# Patient Record
Sex: FEMALE | Race: WHITE | Employment: FULL TIME | URBAN - METROPOLITAN AREA
[De-identification: names, ages, dates, MRNs, and addresses within clinical notes are randomized per-mention and may not be internally consistent; named-entity substitution may affect disease eponyms.]

---

## 2019-01-15 ENCOUNTER — OFFICE VISIT (OUTPATIENT)
Dept: URGENT CARE | Facility: CLINIC | Age: 19
End: 2019-01-15
Payer: COMMERCIAL

## 2019-01-15 VITALS
OXYGEN SATURATION: 98 % | WEIGHT: 179 LBS | BODY MASS INDEX: 30.56 KG/M2 | DIASTOLIC BLOOD PRESSURE: 65 MMHG | TEMPERATURE: 97.4 F | RESPIRATION RATE: 16 BRPM | HEART RATE: 94 BPM | SYSTOLIC BLOOD PRESSURE: 122 MMHG | HEIGHT: 64 IN

## 2019-01-15 DIAGNOSIS — R11.10 ABDOMINAL PAIN WITH VOMITING: Primary | ICD-10-CM

## 2019-01-15 DIAGNOSIS — R10.9 ABDOMINAL PAIN WITH VOMITING: Primary | ICD-10-CM

## 2019-01-15 PROCEDURE — 99213 OFFICE O/P EST LOW 20 MIN: CPT | Performed by: PHYSICIAN ASSISTANT

## 2019-01-15 RX ORDER — ONDANSETRON 4 MG/1
4 TABLET, ORALLY DISINTEGRATING ORAL EVERY 6 HOURS PRN
Qty: 20 TABLET | Refills: 0 | Status: SHIPPED | OUTPATIENT
Start: 2019-01-15

## 2019-01-15 NOTE — LETTER
January 15, 2019     Patient: Felecia Franco   YOB: 2000   Date of Visit: 1/15/2019       To Whom it May Concern:    Felecia Franco was seen in my clinic on 1/15/2019  She may return to work on 01/17/2019  Patient may return sooner if symptoms have improved  If you have any questions or concerns, please don't hesitate to call  Sincerely,          Marissa Pepe PA-C        CC: No Recipients

## 2019-01-15 NOTE — PATIENT INSTRUCTIONS
Use medication as directed for symptoms  Motrin and/or Tylenol as needed for fevers or aches and pains  Drink plenty of fluids and stay well hydrated  Follow up with PCP in 3-5 days  Proceed to  ER if symptoms worsen  Abdominal Pain   AMBULATORY CARE:   Abdominal pain  can be dull, achy, or sharp  You may have pain in one area of your abdomen, or in your entire abdomen  Your pain may be caused by a condition such as constipation, food sensitivity or poisoning, infection, or a blockage  Abdominal pain can also be from a hernia, appendicitis, or an ulcer  Liver, gallbladder, or kidney conditions can also cause abdominal pain  The cause of your abdominal pain may be unknown  Seek care immediately if:   · You have new chest pain or shortness of breath  · You have pulsing pain in your upper abdomen or lower back that suddenly becomes constant  · Your pain is in the right lower abdominal area and worsens with movement  · You have a fever over 100 4°F (38°C) or shaking chills  · You are vomiting and cannot keep food or liquids down  · Your pain does not improve or gets worse over the next 8 to 12 hours  · You see blood in your vomit or bowel movements, or they look black and tarry  · Your skin or the whites of your eyes turn yellow  · You are a woman and have a large amount of vaginal bleeding that is not your monthly period  Contact your healthcare provider if:   · You have pain in your lower back  · You are a man and have pain in your testicles  · You have pain when you urinate  · You have questions or concerns about your condition or care  Treatment for abdominal pain  may include medicine to calm your stomach, prevent vomiting, or decrease pain  Follow up with your healthcare provider as directed:  Write down your questions so you remember to ask them during your visits     © 2017 [employer] Information is for End User's use only and may not be sold, redistributed or otherwise used for commercial purposes  All illustrations and images included in CareNotes® are the copyrighted property of A [employer] M [employer]  or [employer]  The above information is an  only  It is not intended as medical advice for individual conditions or treatments  Talk to your doctor, nurse or pharmacist before following any medical regimen to see if it is safe and effective for you  Acute Nausea and Vomiting   AMBULATORY CARE:   Acute nausea and vomiting  starts suddenly, gets worse quickly, and lasts a short time  Common causes include pregnancy, alcohol, infection, and medicines  A head injury, heart attack, or inner ear imbalance can also cause acute nausea and vomiting  Seek care immediately if:   · You see blood in your vomit or your bowel movements  · You have sudden, severe pain in your chest and upper abdomen after hard vomiting or retching  · You have swelling in your neck and chest      · You are dizzy, cold, and thirsty and your eyes and mouth are dry  · You are urinating very little or not at all  · You have muscle weakness, leg cramps, and trouble breathing  · Your heart is beating much faster than normal      · You continue to vomit for more than 48 hours  Contact your healthcare provider if:   · You have frequent dry heaves (vomiting but nothing comes out)  · Your nausea and vomiting does not get better or go away after you use medicine  · You have questions or concerns about your condition or treatment  Treatment for acute nausea and vomiting  may include medicines to calm your stomach and stop the vomiting  You may need IV fluids if you are dehydrated  Prevent or manage acute nausea and vomiting:   · Do not drink alcohol  Alcohol may upset or irritate your stomach  Too much alcohol can also cause acute nausea and vomiting  · Control stress  Headaches due to stress may cause nausea and vomiting   Find ways to relax and manage your stress  Get more rest and sleep  · Drink more liquids as directed  Vomiting can lead to dehydration  It is important to drink more liquids to help replace lost body fluids  Ask your healthcare provider how much liquid to drink each day and which liquids are best for you  Your provider may recommend that you drink an oral rehydration solution (ORS)  ORS contains water, salts, and sugar that are needed to replace the lost body fluids  Ask what kind of ORS to use, how much to drink, and where to get it  · Eat smaller meals, more often  Eat small amounts of food every 2 to 3 hours, even if you are not hungry  Food in your stomach may decrease your nausea  · Talk to your healthcare provider before you take over-the-counter (OTC) medicines  These medicines can cause serious problems if you use certain other medicines, or you have a medical condition  You may have problems if you use too much or use them for longer than the label says  Follow directions on the label carefully  Follow up with your healthcare provider as directed:  Write down your questions so you remember to ask them during your visits  © 2017 [facility] [facility] Information is for End User's use only and may not be sold, redistributed or otherwise used for commercial purposes  All illustrations and images included in CareNotes® are the copyrighted property of A [employer] A M [employer]  or [employer]  The above information is an  only  It is not intended as medical advice for individual conditions or treatments  Talk to your doctor, nurse or pharmacist before following any medical regimen to see if it is safe and effective for you  Gastroenteritis   AMBULATORY CARE:   Gastroenteritis , or stomach flu, is an infection of the stomach and intestines  It is caused by bacteria, parasites, or viruses         Common symptoms include the following:   · Diarrhea or gas    · Nausea, vomiting, or poor appetite    · Abdominal cramps, pain, or gurgling    · Fever    · Tiredness or weakness    · Headaches or muscle aches with any of the above symptoms  Call 911 for any of the following:   · You have trouble breathing or a very fast pulse  Seek care immediately if:   · You see blood in your diarrhea  · You cannot stop vomiting  · You have not urinated for 12 hours  · You feel like you are going to faint  Contact your healthcare provider if:   · You have a fever  · You continue to vomit or have diarrhea, even after treatment  · You see worms in your diarrhea  · Your mouth or eyes are dry  You are not urinating as much or as often  · You have questions or concerns about your condition or care  Treatment for gastroenteritis  may include medicines to slow or stop your diarrhea or vomiting  You may also need medicines to treat an infection caused by bacteria or a parasite  Manage your symptoms:   · Drink liquids as directed  Ask your healthcare provider how much liquid to drink each day, and which liquids are best for you  You may also need to drink an oral rehydration solution (ORS)  An ORS has the right amounts of sugar, salt, and minerals in water to replace body fluids  · Eat bland foods  When you feel hungry, begin eating soft, bland foods  Examples are bananas, clear soup, potatoes, and applesauce  Do not have dairy products, alcohol, sugary drinks, or drinks with caffeine until you feel better  · Rest as much as possible  Slowly start to do more each day when you begin to feel better  Prevent the spread of germs:  Gastroenteritis can spread easily  Keep yourself, your family, and your surroundings clean to help prevent the spread of gastroenteritis:  · Wash your hands often  Use soap and water  Wash your hands after you use the bathroom, change a child's diapers, or sneeze  Wash your hands before you prepare or eat food  · Clean surfaces and do laundry often    Wash your clothes and towels separately from the rest of the laundry  Clean surfaces in your home with antibacterial  or bleach  · Clean food thoroughly and cook safely  Wash raw vegetables before you cook  Cook meat, fish, and eggs fully  Do not use the same dishes for raw meat as you do for other foods  Refrigerate any leftover food immediately  · Be aware when you camp or travel  Drink only clean water  Do not drink from rivers or lakes unless you purify or boil the water first  When you travel, drink bottled water and do not add ice  Do not eat fruit that has not been peeled  Do not eat raw fish or meat that is not fully cooked  Follow up with your healthcare provider as directed:  Write down your questions so you remember to ask them during your visits  © 2017 [employer] Information is for End User's use only and may not be sold, redistributed or otherwise used for commercial purposes  All illustrations and images included in CareNotes® are the copyrighted property of [employer]  or [employer]  The above information is an  only  It is not intended as medical advice for individual conditions or treatments  Talk to your doctor, nurse or pharmacist before following any medical regimen to see if it is safe and effective for you

## 2019-01-15 NOTE — PROGRESS NOTES
[facility]        NAME: Nimo Layne is a 19 y o  female  : 2000    MRN: [id number]  DATE: January 15, 2019  TIME: 4:42 PM    Assessment and Plan   Abdominal pain with vomiting [R10 9, R11 10]  1  Abdominal pain with vomiting  ondansetron (ZOFRAN-ODT) 4 mg disintegrating tablet         Patient Instructions     Use medication as directed for symptoms  Motrin and/or Tylenol as needed for fevers or aches and pains  Drink plenty of fluids and stay well hydrated  Follow up with PCP in 3-5 days  Proceed to  ER if symptoms worsen  Chief Complaint     Chief Complaint   Patient presents with    Nausea     Nausea starting this morning         History of Present Illness       75-year-old female presents with abdominal pain nausea and vomiting  Reports symptoms started this morning when she woke up  Symptoms were worse this morning with multiple bouts of vomiting and stop and more stomach pain  Symptoms have seemed to be improving over the course the day but still feels very nauseous  Denies any diarrhea  No fevers chills  No headaches sore throat or ear pains reported  Has been tolerating small sips of water      Vomiting    This is a new problem  The current episode started today  The problem has been waxing and waning  The emesis has an appearance of stomach contents  There has been no fever  The fever has been present for less than 1 day  Associated symptoms include abdominal pain  Pertinent negatives include no arthralgias, chest pain, chills, coughing, diarrhea, dizziness, fever or myalgias  Risk factors include ill contacts  She has tried nothing for the symptoms  The treatment provided no relief  Review of Systems   Review of Systems   Constitutional: Negative  Negative for chills and fever  HENT: Negative  Eyes: Negative  Respiratory: Negative  Negative for cough  Cardiovascular: Negative  Negative for chest pain     Gastrointestinal: Positive for abdominal pain and vomiting  Negative for diarrhea  Musculoskeletal: Negative  Negative for arthralgias and myalgias  Skin: Negative  Neurological: Negative  Negative for dizziness  Current Medications       Current Outpatient Prescriptions:     UNKNOWN TO PATIENT, , Disp: , Rfl:     ondansetron (ZOFRAN-ODT) 4 mg disintegrating tablet, Take 1 tablet (4 mg total) by mouth every 6 (six) hours as needed for nausea or vomiting, Disp: 20 tablet, Rfl: 0    Current Allergies     Allergies as of 01/15/2019    (No Known Allergies)            The following portions of the patient's history were reviewed and updated as appropriate: allergies, current medications, past family history, past medical history, past social history, past surgical history and problem list      Past Medical History:   Diagnosis Date    Known health problems: none        Past Surgical History:   Procedure Laterality Date    WISDOM TOOTH EXTRACTION         History reviewed  No pertinent family history  Medications have been verified  Objective   /65   Pulse 94   Temp (!) 97 4 °F (36 3 °C) (Tympanic)   Resp 16   Ht 5' 4" (1 626 m)   Wt 81 2 kg (179 lb)   LMP 12/29/2018 (Approximate)   SpO2 98%   BMI 30 73 kg/m²        Physical Exam     Physical Exam   Constitutional: She is oriented to person, place, and time  She appears well-developed and well-nourished  No distress  HENT:   Head: Normocephalic and atraumatic  Right Ear: External ear normal    Left Ear: External ear normal    Nose: Nose normal    Mouth/Throat: Oropharynx is clear and moist  No oropharyngeal exudate  Eyes: Conjunctivae are normal  Right eye exhibits no discharge  Left eye exhibits no discharge  Neck: Normal range of motion  Neck supple  Cardiovascular: Normal rate, regular rhythm, normal heart sounds and intact distal pulses  No murmur heard  Pulmonary/Chest: Effort normal and breath sounds normal  No respiratory distress  She has no wheezes  She has no rales  Abdominal: Soft  Bowel sounds are normal  There is tenderness (Mild to moderate) in the epigastric area  Musculoskeletal: Normal range of motion  Lymphadenopathy:     She has no cervical adenopathy  Neurological: She is alert and oriented to person, place, and time  Skin: Skin is warm and dry  Psychiatric: She has a normal mood and affect  Nursing note and vitals reviewed

## 2021-09-29 ENCOUNTER — OFFICE VISIT (OUTPATIENT)
Dept: URGENT CARE | Facility: CLINIC | Age: 21
End: 2021-09-29
Payer: COMMERCIAL

## 2021-09-29 VITALS
OXYGEN SATURATION: 96 % | RESPIRATION RATE: 16 BRPM | HEIGHT: 64 IN | WEIGHT: 185 LBS | BODY MASS INDEX: 31.58 KG/M2 | TEMPERATURE: 97.6 F | HEART RATE: 86 BPM

## 2021-09-29 DIAGNOSIS — B34.9 VIRAL SYNDROME: Primary | ICD-10-CM

## 2021-09-29 PROCEDURE — 99213 OFFICE O/P EST LOW 20 MIN: CPT | Performed by: PHYSICIAN ASSISTANT

## 2021-09-29 PROCEDURE — U0005 INFEC AGEN DETEC AMPLI PROBE: HCPCS | Performed by: PHYSICIAN ASSISTANT

## 2021-09-29 PROCEDURE — U0003 INFECTIOUS AGENT DETECTION BY NUCLEIC ACID (DNA OR RNA); SEVERE ACUTE RESPIRATORY SYNDROME CORONAVIRUS 2 (SARS-COV-2) (CORONAVIRUS DISEASE [COVID-19]), AMPLIFIED PROBE TECHNIQUE, MAKING USE OF HIGH THROUGHPUT TECHNOLOGIES AS DESCRIBED BY CMS-2020-01-R: HCPCS | Performed by: PHYSICIAN ASSISTANT

## 2021-09-29 NOTE — LETTER
September 29, 2021     Patient: Lupe Burton   YOB: 2000   Date of Visit: 9/29/2021       To Whom It May Concern: It is my medical opinion that Lupe Burton should remain out of work for 10 days since symptom onset or 24 hours fever free without the use of fever reducing drugs, whichever is longer AND overall general improvement in symptoms OR 10 days since last exposure OR negative results           Sincerely,        Jay Pedersen PA-C

## 2021-09-29 NOTE — PROGRESS NOTES
[facility]        NAME: Ignacio Lozano is a 19 y o  female  : 2000    MRN: [id number]  DATE: 2021  TIME: 9:37 AM    Assessment and Plan   Viral syndrome [B34 9]  1  Viral syndrome  Novel Coronavirus (Covid-19),PCR [facility] - Office Collection         Patient Instructions   Covid 19 results will return in a 24-48 hours  If you view your results on MyChart, we will not call you  If you do not see results on MyChart, we will call you if your positive or negative  Prophylactically self quarantine  Department of health's newest recommendations state patient should self quarantine for 10 days since symptom onset or 24 hours fever free without the use of fever reducing drugs (Tylenol and ibuprofen), whichever is longer AND overall improvement of symptoms  Drink lots of fluids to maintain hydration  Do not touch your face, wash hands often, and practice social distancing  There is no treatment for outpatient COVID-19 however, CDC recommends 1000 mg vitamin-C, 2000 units vitamin D3, and 100 mg zinc to boost the immune system  Call your family doctor to have a follow-up appointment in next few days  Go to ER if he began experiencing chest pain, shortness of breath, fever that is not responding to antipyretics or other severe symptoms  Follow up with PCP in 3-5 days  Proceed to  ER if symptoms worsen  Chief Complaint     Chief Complaint   Patient presents with    COVID-19     nausea, congestion, sore throat resolved, chills, dizziness, fatigue  symptoms started yesterday  History of Present Illness         Patient is a 68-year-old female with no significant past medical history presents to office complaining of fatigue, chills, dizziness, congestion, sore throat, and nausea since yesterday  She denies fever, cough, SOB, CP, difficulty breathing, anosmia, dysgeusia, or weakness     Patient's boyfriend is currently sick you had a direct exposure to go ahead and is waiting on his test results  Denies prior COVID-19 infection  Denies COVID-19 vaccination  Review of Systems   Review of Systems   Constitutional: Positive for chills and fatigue  Negative for fever  HENT: Positive for congestion and sore throat  Respiratory: Negative for cough and shortness of breath  Cardiovascular: Negative for chest pain and palpitations  Gastrointestinal: Positive for nausea  Negative for abdominal pain, diarrhea and vomiting  Musculoskeletal: Negative for myalgias  Neurological: Positive for dizziness  Negative for light-headedness and headaches  Current Medications       Current Outpatient Medications:     ondansetron (ZOFRAN-ODT) 4 mg disintegrating tablet, Take 1 tablet (4 mg total) by mouth every 6 (six) hours as needed for nausea or vomiting (Patient not taking: Reported on 9/29/2021), Disp: 20 tablet, Rfl: 0    UNKNOWN TO PATIENT, , Disp: , Rfl:     Current Allergies     Allergies as of 09/29/2021    (No Known Allergies)            The following portions of the patient's history were reviewed and updated as appropriate: allergies, current medications, past family history, past medical history, past social history, past surgical history and problem list      Past Medical History:   Diagnosis Date    Known health problems: none        Past Surgical History:   Procedure Laterality Date    WISDOM TOOTH EXTRACTION         History reviewed  No pertinent family history  Medications have been verified  Objective   Pulse 86   Temp 97 6 °F (36 4 °C)   Resp 16   Ht 5' 4" (1 626 m)   Wt 83 9 kg (185 lb)   LMP 09/15/2021   SpO2 96%   BMI 31 76 kg/m²   Patient's last menstrual period was 09/15/2021  Physical Exam     Physical Exam  Vitals and nursing note reviewed  Constitutional:       Appearance: Normal appearance  She is well-developed  HENT:      Head: Normocephalic and atraumatic        Right Ear: Tympanic membrane, ear canal and external ear normal  Left Ear: Tympanic membrane, ear canal and external ear normal       Nose: Congestion and rhinorrhea present  Mouth/Throat:      Pharynx: Uvula midline  Eyes:      General: Lids are normal       Conjunctiva/sclera: Conjunctivae normal       Pupils: Pupils are equal, round, and reactive to light  Cardiovascular:      Rate and Rhythm: Normal rate and regular rhythm  Pulses: Normal pulses  Heart sounds: Normal heart sounds  No murmur heard  No friction rub  No gallop  Pulmonary:      Effort: Pulmonary effort is normal       Breath sounds: Normal breath sounds  No wheezing, rhonchi or rales  Abdominal:      General: Bowel sounds are normal       Palpations: Abdomen is soft  Tenderness: There is no abdominal tenderness  Musculoskeletal:         General: Normal range of motion  Cervical back: Neck supple  Lymphadenopathy:      Cervical: No cervical adenopathy  Skin:     General: Skin is warm and dry  Capillary Refill: Capillary refill takes less than 2 seconds  Neurological:      Mental Status: She is alert

## 2021-09-30 ENCOUNTER — TELEPHONE (OUTPATIENT)
Dept: URGENT CARE | Facility: CLINIC | Age: 21
End: 2021-09-30

## 2021-09-30 LAB — SARS-COV-2 RNA RESP QL NAA+PROBE: POSITIVE

## 2021-09-30 NOTE — TELEPHONE ENCOUNTER
Attempted to contact patient regarding COVID-19 results  Patient tested positive  Phone number in chart is invalid

## 2022-10-25 ENCOUNTER — OFFICE VISIT (OUTPATIENT)
Dept: URGENT CARE | Facility: CLINIC | Age: 22
End: 2022-10-25
Payer: COMMERCIAL

## 2022-10-25 VITALS
BODY MASS INDEX: 31.89 KG/M2 | WEIGHT: 180 LBS | TEMPERATURE: 98.9 F | SYSTOLIC BLOOD PRESSURE: 118 MMHG | RESPIRATION RATE: 18 BRPM | DIASTOLIC BLOOD PRESSURE: 67 MMHG | HEART RATE: 99 BPM | HEIGHT: 63 IN | OXYGEN SATURATION: 98 %

## 2022-10-25 DIAGNOSIS — J02.0 STREP PHARYNGITIS: Primary | ICD-10-CM

## 2022-10-25 LAB
S PYO AG THROAT QL: POSITIVE
SARS-COV-2 AG UPPER RESP QL IA: NEGATIVE
VALID CONTROL: NORMAL

## 2022-10-25 PROCEDURE — 99213 OFFICE O/P EST LOW 20 MIN: CPT | Performed by: PHYSICIAN ASSISTANT

## 2022-10-25 PROCEDURE — 87880 STREP A ASSAY W/OPTIC: CPT | Performed by: PHYSICIAN ASSISTANT

## 2022-10-25 PROCEDURE — 87811 SARS-COV-2 COVID19 W/OPTIC: CPT | Performed by: PHYSICIAN ASSISTANT

## 2022-10-25 RX ORDER — AMOXICILLIN 500 MG/1
500 CAPSULE ORAL EVERY 12 HOURS SCHEDULED
Qty: 20 CAPSULE | Refills: 0 | Status: SHIPPED | OUTPATIENT
Start: 2022-10-25 | End: 2022-10-25

## 2022-10-25 RX ORDER — AMOXICILLIN 250 MG/5ML
500 POWDER, FOR SUSPENSION ORAL 2 TIMES DAILY
Qty: 200 ML | Refills: 0 | Status: SHIPPED | OUTPATIENT
Start: 2022-10-25 | End: 2022-11-04

## 2022-10-25 NOTE — LETTER
October 25, 2022     Patient: Mariam Schlatter   YOB: 2000   Date of Visit: 10/25/2022       To Whom It May Concern: It is my medical opinion that Mariam Schlatter should remain out of work until symptoms are improved             Sincerely,        Nelly Martino PA-C

## 2022-10-25 NOTE — PROGRESS NOTES
[facility]        NAME: Elissa Bobby is a 23 y o  female  : 2000    MRN: [id number]  DATE: 2022  TIME: 1:34 PM    Assessment and Plan   Strep pharyngitis [J02 0]  1  Strep pharyngitis  Poct Covid 19 Rapid Antigen Test    POCT rapid strepA    amoxicillin (AMOXIL) 250 mg/5 mL oral suspension    DISCONTINUED: amoxicillin (AMOXIL) 500 mg capsule         Patient Instructions   Take antibiotic as prescribed  Complete full dose of antibiotics even if symptoms begin to improve or resolve  This is very contagious; do not share drinks or food with others  Replace your toothbrush in 1-2 days to prevent reinfection  Use OTC Tylenol for fever  Your symptoms should begin to improve over the next couple days  Follow up with PCP in 3-5 days  Proceed to  ER if symptoms worsen  Chief Complaint     Chief Complaint   Patient presents with   • sinus congestion     Sinus congestion, ear aches, sore throat, dizziness, post-nasal drip, body aches, chills, and nausea; symptoms started this AM         History of Present Illness       Patient is a 78-year-old female with no significant past medical history presents the office complaining of fatigue, chills, dizziness, body aches, postnasal drip, sore throat, and nausea since this morning  Pain is rated 8/10  She denies congestion, cough, SOB, CP, nausea, vomiting, abdominal pain or rashes  History of strep in the past states it feels similar in nature  Review of Systems   Review of Systems   Constitutional: Positive for chills and fatigue  Negative for fever  HENT: Positive for postnasal drip, rhinorrhea and sore throat  Negative for congestion  Respiratory: Negative for cough and shortness of breath  Cardiovascular: Negative for chest pain and palpitations  Gastrointestinal: Positive for nausea  Negative for abdominal pain, diarrhea and vomiting  Musculoskeletal: Positive for myalgias  Neurological: Positive for dizziness  Negative for light-headedness and headaches  Current Medications       Current Outpatient Medications:   •  amoxicillin (AMOXIL) 250 mg/5 mL oral suspension, Take 10 mL (500 mg total) by mouth 2 (two) times a day for 10 days, Disp: 200 mL, Rfl: 0  •  ondansetron (ZOFRAN-ODT) 4 mg disintegrating tablet, Take 1 tablet (4 mg total) by mouth every 6 (six) hours as needed for nausea or vomiting (Patient not taking: No sig reported), Disp: 20 tablet, Rfl: 0  •  UNKNOWN TO PATIENT, , Disp: , Rfl:     Current Allergies     Allergies as of 10/25/2022   • (No Known Allergies)            The following portions of the patient's history were reviewed and updated as appropriate: allergies, current medications, past family history, past medical history, past social history, past surgical history and problem list      Past Medical History:   Diagnosis Date   • Known health problems: none        Past Surgical History:   Procedure Laterality Date   • WISDOM TOOTH EXTRACTION         Family History   Problem Relation Age of Onset   • No Known Problems Mother    • No Known Problems Father          Medications have been verified  Objective   /67   Pulse 99   Temp 98 9 °F (37 2 °C)   Resp 18   Ht 5' 3" (1 6 m)   Wt 81 6 kg (180 lb)   LMP 10/01/2022   SpO2 98%   BMI 31 89 kg/m²   Patient's last menstrual period was 10/01/2022  Physical Exam     Physical Exam  Vitals and nursing note reviewed  Constitutional:       Appearance: Normal appearance  She is well-developed  HENT:      Head: Normocephalic and atraumatic  Right Ear: Tympanic membrane, ear canal and external ear normal       Left Ear: Tympanic membrane, ear canal and external ear normal       Nose: Nose normal       Mouth/Throat:      Mouth: Mucous membranes are moist       Pharynx: Uvula midline  Pharyngeal swelling and posterior oropharyngeal erythema present  No oropharyngeal exudate        Tonsils: No tonsillar exudate or tonsillar abscesses  Eyes:      General: Lids are normal       Conjunctiva/sclera: Conjunctivae normal       Pupils: Pupils are equal, round, and reactive to light  Cardiovascular:      Rate and Rhythm: Normal rate and regular rhythm  Pulses: Normal pulses  Heart sounds: Normal heart sounds  No murmur heard  No friction rub  No gallop  Pulmonary:      Effort: Pulmonary effort is normal       Breath sounds: Normal breath sounds  No wheezing, rhonchi or rales  Musculoskeletal:         General: Normal range of motion  Cervical back: Neck supple  Lymphadenopathy:      Cervical: Cervical adenopathy present  Skin:     General: Skin is warm and dry  Capillary Refill: Capillary refill takes less than 2 seconds  Neurological:      Mental Status: She is alert         POC rapid COVID-19 negative    POC rapid strep POSITIVE

## 2022-11-08 ENCOUNTER — OFFICE VISIT (OUTPATIENT)
Dept: URGENT CARE | Facility: CLINIC | Age: 22
End: 2022-11-08

## 2022-11-08 VITALS
HEIGHT: 63 IN | HEART RATE: 80 BPM | RESPIRATION RATE: 16 BRPM | DIASTOLIC BLOOD PRESSURE: 64 MMHG | BODY MASS INDEX: 33.49 KG/M2 | SYSTOLIC BLOOD PRESSURE: 117 MMHG | TEMPERATURE: 96.8 F | OXYGEN SATURATION: 100 % | WEIGHT: 189 LBS

## 2022-11-08 DIAGNOSIS — J06.9 VIRAL URI: Primary | ICD-10-CM

## 2022-11-08 LAB
SARS-COV-2 AG UPPER RESP QL IA: NEGATIVE
VALID CONTROL: NORMAL

## 2022-11-08 NOTE — PATIENT INSTRUCTIONS
800 mg ibuprofen every 8 hours as needed for severe headache  May use Tylenol as well  Excedrin  Be sure to look at active ingredients to not overdose on Tylenol or ibuprofen in medications    Be sure to drink plenty of fluids

## 2022-11-08 NOTE — PROGRESS NOTES
[facility]        NAME: Leslie Horton is a 23 y o  female  : 2000    MRN: [id number]  DATE: 2022  TIME: 5:38 PM    Assessment and Plan   Viral URI [J06 9]  1  Viral URI  Poct Covid 19 Rapid Antigen Test         Patient Instructions   Drink plenty of fluids  May use over the counter cold medications for symptomatic treatment  Do not use medications with Pseudoephedrine or Phenylphrine if you have high blood pressure because it may worsen your blood pressure  Follow up with your PCP in 3-5 days if your symptoms do not improve or if you have any concerns  Go to the ER if symptoms become severe  Follow up with PCP in 3-5 days  Proceed to  ER if symptoms worsen  Chief Complaint     Chief Complaint   Patient presents with   • Cold Like Symptoms     Productive cough with yellow/green mucus production, sinus pressure, headaches, and sore throat starting 4 days ago; sore throat is improving, seen here 2 weeks ago dx with strep         History of Present Illness       Patient is a 68-year-old female with no significant past medical history presents the office complaining of headaches, congestion, sinus pain and pressure, sore throat, productive cough for 4 days  She denies fever, chills, SOB, CP, nausea, vomiting, abdominal pain or rashes  She was seen in the office 2 weeks ago diagnosis strep pharyngitis  She was prescribed antibiotics which she has completed  Sore throat is greatly improving  Review of Systems   Review of Systems   Constitutional: Negative for chills and fever  HENT: Positive for congestion, postnasal drip, rhinorrhea and sore throat  Respiratory: Positive for cough  Negative for shortness of breath  Cardiovascular: Negative for chest pain and palpitations  Gastrointestinal: Negative for abdominal pain, diarrhea, nausea and vomiting  Musculoskeletal: Negative for myalgias  Skin: Negative for rash  Neurological: Positive for headaches   Negative for dizziness and light-headedness  Current Medications       Current Outpatient Medications:   •  ondansetron (ZOFRAN-ODT) 4 mg disintegrating tablet, Take 1 tablet (4 mg total) by mouth every 6 (six) hours as needed for nausea or vomiting (Patient not taking: No sig reported), Disp: 20 tablet, Rfl: 0  •  UNKNOWN TO PATIENT, , Disp: , Rfl:     Current Allergies     Allergies as of 11/08/2022   • (No Known Allergies)            The following portions of the patient's history were reviewed and updated as appropriate: allergies, current medications, past family history, past medical history, past social history, past surgical history and problem list      Past Medical History:   Diagnosis Date   • Known health problems: none        Past Surgical History:   Procedure Laterality Date   • MOUTH SURGERY     • WISDOM TOOTH EXTRACTION         Family History   Problem Relation Age of Onset   • No Known Problems Mother    • No Known Problems Father          Medications have been verified  Objective   /64   Pulse 80   Temp (!) 96 8 °F (36 °C)   Resp 16   Ht 5' 3" (1 6 m)   Wt 85 7 kg (189 lb)   LMP 10/27/2022   SpO2 100%   BMI 33 48 kg/m²   Patient's last menstrual period was 10/27/2022  Physical Exam     Physical Exam  Vitals and nursing note reviewed  Constitutional:       Appearance: Normal appearance  She is well-developed  HENT:      Head: Normocephalic and atraumatic  Right Ear: Tympanic membrane, ear canal and external ear normal       Left Ear: Tympanic membrane, ear canal and external ear normal       Nose: Congestion and rhinorrhea present  Mouth/Throat:      Pharynx: Uvula midline  Eyes:      General: Lids are normal       Conjunctiva/sclera: Conjunctivae normal       Pupils: Pupils are equal, round, and reactive to light  Cardiovascular:      Rate and Rhythm: Normal rate and regular rhythm  Pulses: Normal pulses  Heart sounds: Normal heart sounds   No murmur heard     No friction rub  No gallop  Pulmonary:      Effort: Pulmonary effort is normal       Breath sounds: Normal breath sounds  No wheezing, rhonchi or rales  Musculoskeletal:         General: Normal range of motion  Cervical back: Neck supple  Lymphadenopathy:      Cervical: No cervical adenopathy  Skin:     General: Skin is warm and dry  Capillary Refill: Capillary refill takes less than 2 seconds  Neurological:      Mental Status: She is alert           POC rapid COVID-19 negative

## 2022-11-08 NOTE — LETTER
November 8, 2022     Patient: Eileen Lockwood   YOB: 2000   Date of Visit: 11/8/2022       To Whom It May Concern: It is my medical opinion that Eileen Lockwood should remain out of work until symptoms improve             Sincerely,        Hecotr Modi PA-C

## 2023-05-22 ENCOUNTER — EVALUATION (OUTPATIENT)
Age: 23
End: 2023-05-22

## 2023-05-22 DIAGNOSIS — M24.9 AC JOINT DERANGEMENT: Primary | ICD-10-CM

## 2023-05-22 DIAGNOSIS — G89.29 CHRONIC RIGHT SHOULDER PAIN: ICD-10-CM

## 2023-05-22 DIAGNOSIS — M25.511 CHRONIC RIGHT SHOULDER PAIN: ICD-10-CM

## 2023-05-22 NOTE — PROGRESS NOTES
PT Evaluation     Today's date: 2023  Patient name: Meli Frankel  : 2000  MRN: [id number]  Referring provider: Dang Singer MD  Dx:   Encounter Diagnosis     ICD-10-CM    1  AC joint derangement  M24 9       2  Chronic right shoulder pain  M25 511     G89 29           Start Time: 1700  Stop Time: 1745  Total time in clinic (min): 45 minutes    Assessment  Assessment details: Patient is a 24 yo female presenting to physical therapy with symptoms consistent with R shoulder pain that started to increase about 2 years ago  Patient presents with decreased shoulder and scapular strength, decreased shoulder and cervical ROM, poor posture and decreased activity tolerance  Patient has increased difficulty with overhead reaching and lifting, sleeping and repetitive lifting  PT educated the patient on posture and the patient noted increased difficulty with tall posture, however was able to achieve  PT will address the noted impairments by performing shoulder and scapular strengthening, stretching, balance, functional activities and manual techniques to allow the patient to return to her PLOF  PT recommended 2x/week for 4-6 weeks c a good prognosis 2* PLOF  Impairments: abnormal or restricted ROM, activity intolerance, impaired physical strength, lacks appropriate home exercise program, pain with function, poor posture  and poor body mechanics    Symptom irritability: lowUnderstanding of Dx/Px/POC: good   Prognosis: good    Goals  STG: In four weeks the patient will:    1  Be (I) with her HEP  2  Increase shoulder strength to 4+/5 MMT score to assist c ADLs  3  Increase cervical ROM by 25% to assist c driving and sleeping  LTG: In six weeks, the patient will:    1  Increase FOTO score to 80 to demonstrate improvements in symptoms and function  2  Demonstrate full R shoulder AROM without pain  3  Perform overhead lifting without pain     4  Increase shoulder strength to 5/5 MMT score to assist c prolonged activities  5  Complete a half day of work without pain  6  Complete a full day of work without pain  7  Lift 50# x10 without pain  8  Sleep 5 hours without pain  Plan  Patient would benefit from: skilled physical therapy and PT eval  Planned modality interventions: cryotherapy and thermotherapy: hydrocollator packs  Planned therapy interventions: abdominal trunk stabilization, joint mobilization, manual therapy, massage, Leroy taping, neuromuscular re-education, patient education, postural training, balance, body mechanics training, breathing training, strengthening, stretching, therapeutic activities, therapeutic exercise, transfer training, home exercise program, gait training, functional ROM exercises and flexibility  Frequency: 2x week  Duration in visits: 12  Duration in weeks: 6  Plan of Care beginning date: 2023  Plan of Care expiration date: 7/3/2023  Treatment plan discussed with: patient        Subjective Evaluation    History of Present Illness  Mechanism of injury: Patient noted about 6 years ago she fell off of a  trunk and landed on the R shoulder  Patient noted that she tore muscles and was in a sling for about 3 weeks  Patient did not go to PT  Patient noted about 2-3 years ago she noted a little pain  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  Patient noted after lifting 50#s in a row she has increased pain at the end of the day  Patient noted when she sleeps on the R side with her arm bent it is numb when she wakes up  Patient noted when she wakes up the numbness goes away within 1 5 hours  Patient noted a hx of potential carpal tunnel in both wrists              Recurrent probem    Quality of life: good    Pain  Current pain ratin  At best pain ratin  At worst pain ratin  Location: R shoulder  Quality: dull ache  Relieving factors: ice and rest  Aggravating factors: overhead activity and lifting  Progression: "worsening    Social Support  Steps to enter house: yes  4  Stairs in house: yes   Lives in[place]  Lives with: parents    Employment status: working ([employer] )  Hand dominance: right      Diagnostic Tests  X-ray: abnormal (AC joint separation)  Patient Goals  Patient goals for therapy: increased strength, independence with ADLs/IADLs, return to sport/leisure activities, return to work, increased motion, improved balance and decreased pain  Patient goal: \"to get stronger  \" \"to lifting without pain  \"         Objective     Postural Observations  Seated posture: fair  Standing posture: fair  Correction of posture: makes symptoms better        Tenderness     Right Shoulder  Tenderness in the [facility] joint, acromion and coracoid process  Cervical/Thoracic Screen   Cervical range of motion within normal limits with the following exceptions: Limitations in sidebending  Active Range of Motion   Left Shoulder   Normal active range of motion  External rotation BTH: C4   Internal rotation BTB: L1     Right Shoulder   Normal active range of motion  Flexion: 165 degrees   Abduction: 165 degrees   External rotation BTH: C7   Internal rotation BTB: T12     Strength/Myotome Testing     Left Shoulder     Planes of Motion   Flexion: 4   Abduction: 4   External rotation at 0°: 3   Internal rotation at 0°: 3+     Isolated Muscles   Biceps: 4   Triceps: 4     Right Shoulder     Planes of Motion   Flexion: 4   Abduction: 4 (pain)   External rotation at 0°: 3   Internal rotation at 0°: 3+     Isolated Muscles   Biceps: 4   Triceps: 4     Tests     Right Shoulder   Negative belly press, full can and Neer's         Flowsheet Rows    Flowsheet Row Most Recent Value   PT/OT G-Codes    Current Score 79   Projected Score 80   Assessment Type Evaluation             Precautions: none      Manuals 5/22            R shoulder PROM nv            R shoulder posterior mob nv                                      Neuro Re-Ed             HEP " "edu & posture edu MW            Shoulder isometric nv            Scapular retraction x20            Serratus punch nv            sidelying ER nv            Chin tuck x15                         Ther Ex             UBE nv            UT and levator scap stretch nv            Scalene stretch nv            Rows and extensions nv            pec stretch 5x15\"                                                    Ther Activity                                       Gait Training                                       Modalities                                          "

## 2023-05-22 NOTE — LETTER
May 22, 2023    [address]  [address] [place] [address]    Patient: Nan Vazquez   YOB: 2000   Date of Visit: 2023     Encounter Diagnosis     ICD-10-CM    1  AC joint derangement  M24 9       2  Chronic right shoulder pain  M25 511     G89 29           Dear Dr Tian Guardado: Thank you for your recent referral of Lisa Alfred  Please review the attached evaluation summary from [facility] recent visit  Please verify that you agree with the plan of care by signing the attached order  If you have any questions or concerns, please do not hesitate to call  I sincerely appreciate the opportunity to share in the care of one of your patients and hope to have another opportunity to work with you in the near future  Sincerely,    Ted Youngblood, PT      Referring Provider:      I certify that I have read the below Plan of Care and certify the need for these services furnished under this plan of treatment while under my care  Panchito Velasquez MD  [address] [place] [address]  Via Fax: [phone]          PT Evaluation     Today's date: 2023  Patient name: Nan Vazquez  : 2000  MRN: [id number]  Referring provider: Timothy Jackson MD  Dx:   Encounter Diagnosis     ICD-10-CM    1  AC joint derangement  M24 9       2  Chronic right shoulder pain  M25 511     G89 29           Start Time: 1700  Stop Time: 1745  Total time in clinic (min): 45 minutes    Assessment  Assessment details: Patient is a 22 yo female presenting to physical therapy with symptoms consistent with R shoulder pain that started to increase about 2 years ago  Patient presents with decreased shoulder and scapular strength, decreased shoulder and cervical ROM, poor posture and decreased activity tolerance  Patient has increased difficulty with overhead reaching and lifting, sleeping and repetitive lifting   PT educated the patient on posture and the patient noted increased difficulty with tall posture, however was able to achieve  PT will address the noted impairments by performing shoulder and scapular strengthening, stretching, balance, functional activities and manual techniques to allow the patient to return to her PLOF  PT recommended 2x/week for 4-6 weeks c a good prognosis 2* PLOF  Impairments: abnormal or restricted ROM, activity intolerance, impaired physical strength, lacks appropriate home exercise program, pain with function, poor posture  and poor body mechanics    Symptom irritability: lowUnderstanding of Dx/Px/POC: good   Prognosis: good    Goals  STG: In four weeks the patient will:    1  Be (I) with her HEP  2  Increase shoulder strength to 4+/5 MMT score to assist c ADLs  3  Increase cervical ROM by 25% to assist c driving and sleeping  LTG: In six weeks, the patient will:    1  Increase FOTO score to 80 to demonstrate improvements in symptoms and function  2  Demonstrate full R shoulder AROM without pain  3  Perform overhead lifting without pain  4  Increase shoulder strength to 5/5 MMT score to assist c prolonged activities  5  Complete a half day of work without pain  6  Complete a full day of work without pain  7  Lift 50# x10 without pain  8  Sleep 5 hours without pain        Plan  Patient would benefit from: skilled physical therapy and PT eval  Planned modality interventions: cryotherapy and thermotherapy: hydrocollator packs  Planned therapy interventions: abdominal trunk stabilization, joint mobilization, manual therapy, massage, Leroy taping, neuromuscular re-education, patient education, postural training, balance, body mechanics training, breathing training, strengthening, stretching, therapeutic activities, therapeutic exercise, transfer training, home exercise program, gait training, functional ROM exercises and flexibility  Frequency: 2x week  Duration in visits: 12  Duration in weeks: 6  Plan of Care beginning date: "2023  Plan of Care expiration date: 7/3/2023  Treatment plan discussed with: patient        Subjective Evaluation    History of Present Illness  Mechanism of injury: Patient noted about 6 years ago she fell off of a  trunk and landed on the R shoulder  Patient noted that she tore muscles and was in a sling for about 3 weeks  Patient did not go to PT  Patient noted about 2-3 years ago she noted a little pain  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  Patient noted after lifting 50#s in a row she has increased pain at the end of the day  Patient noted when she sleeps on the R side with her arm bent it is numb when she wakes up  Patient noted when she wakes up the numbness goes away within 1 5 hours  Patient noted a hx of potential carpal tunnel in both wrists  Recurrent probem    Quality of life: good    Pain  Current pain ratin  At best pain ratin  At worst pain ratin  Location: R shoulder  Quality: dull ache  Relieving factors: ice and rest  Aggravating factors: overhead activity and lifting  Progression: worsening    Social Support  Steps to enter house: yes  4  Stairs in house: yes   Lives in: [facility]  Lives with: parents    Employment status: working ([employer] )  Hand dominance: right      Diagnostic Tests  X-ray: abnormal (AC joint separation)  Patient Goals  Patient goals for therapy: increased strength, independence with ADLs/IADLs, return to sport/leisure activities, return to work, increased motion, improved balance and decreased pain  Patient goal: \"to get stronger  \" \"to lifting without pain  \"         Objective     Postural Observations  Seated posture: fair  Standing posture: fair  Correction of posture: makes symptoms better        Tenderness     Right Shoulder  Tenderness in the [facility] joint, acromion and coracoid process       Cervical/Thoracic Screen   Cervical range of motion within normal limits with the following exceptions: " "Limitations in sidebending  Active Range of Motion   Left Shoulder   Normal active range of motion  External rotation BTH: C4   Internal rotation BTB: L1     Right Shoulder   Normal active range of motion  Flexion: 165 degrees   Abduction: 165 degrees   External rotation BTH: C7   Internal rotation BTB: T12     Strength/Myotome Testing     Left Shoulder     Planes of Motion   Flexion: 4   Abduction: 4   External rotation at 0°: 3   Internal rotation at 0°: 3+     Isolated Muscles   Biceps: 4   Triceps: 4     Right Shoulder     Planes of Motion   Flexion: 4   Abduction: 4 (pain)   External rotation at 0°: 3   Internal rotation at 0°: 3+     Isolated Muscles   Biceps: 4   Triceps: 4     Tests     Right Shoulder   Negative belly press, full can and Neer's         Flowsheet Rows    Flowsheet Row Most Recent Value   PT/OT G-Codes    Current Score 79   Projected Score 80   Assessment Type Evaluation            Precautions: none      Manuals 5/22            R shoulder PROM nv            R shoulder posterior mob nv                                      Neuro Re-Ed             HEP edu & posture edu MW            Shoulder isometric nv            Scapular retraction x20            Serratus punch nv            sidelying ER nv            Chin tuck x15                         Ther Ex             UBE nv            UT and levator scap stretch nv            Scalene stretch nv            Rows and extensions nv            pec stretch 5x15\"                                                    Ther Activity                                       Gait Training                                       Modalities                                                       "

## 2023-05-25 ENCOUNTER — OFFICE VISIT (OUTPATIENT)
Age: 23
End: 2023-05-25

## 2023-05-25 DIAGNOSIS — M25.511 CHRONIC RIGHT SHOULDER PAIN: ICD-10-CM

## 2023-05-25 DIAGNOSIS — G89.29 CHRONIC RIGHT SHOULDER PAIN: ICD-10-CM

## 2023-05-25 DIAGNOSIS — M24.9 AC JOINT DERANGEMENT: Primary | ICD-10-CM

## 2023-05-25 NOTE — PROGRESS NOTES
"Daily Note     Today's date: 2023  Patient name: Lorrie Robins  : 2000  MRN: [id number]  Referring provider: Vernice Kocher, MD  Dx:   Encounter Diagnosis     ICD-10-CM    1  AC joint derangement  M24 9       2  Chronic right shoulder pain  M25 511     G89 29           Start Time: 1615  Stop Time: 1700  Total time in clinic (min): 45 minutes    Subjective: Patient noted today was a tough day at work  Patient noted some discomfort with the pec stretch at home  Objective: See treatment diary below      Assessment: Patient performed UBE aerobic exercise to increase blood flow to the area being treated, prepare the muscles for strength training and stretching, improve overall tolerance to activity, and aerobic endurance  Patient performed various strengthening and stretching exercises with min VCs for form  Patient noted some paresthesias on the L UE with sidelying ER, however with stretching this decreased  Patient noted fatigue post session  PT added to her HEP  Patient would benefit from continued PT to allow the patient to return to her PLOF  Plan: Continue per plan of care        Precautions: none      Manuals            R shoulder PROM nv            R shoulder posterior mob nv                                      Neuro Re-Ed             HEP edu & posture edu MW MW           Shoulder isometric nv            Scapular retraction x20 x20           Serratus punch nv x20           sidelying ER nv x15 - 20 ea           Chin tuck x15 x20  3\" hold                        Ther Ex             UBE nv 10'           levator scap stretch nv 3x30\" ea           Scalene stretch nv 5x15\" ea           Rows and extensions nv nv           pec stretch 5x15\"  hold           No money  PTB  x15                                     Ther Activity                                       Gait Training                                       Modalities                                            "

## 2023-06-01 ENCOUNTER — APPOINTMENT (OUTPATIENT)
Age: 23
End: 2023-06-01
Payer: COMMERCIAL

## 2023-06-02 ENCOUNTER — APPOINTMENT (OUTPATIENT)
Age: 23
End: 2023-06-02
Payer: COMMERCIAL

## 2023-06-06 ENCOUNTER — OFFICE VISIT (OUTPATIENT)
Age: 23
End: 2023-06-06
Payer: COMMERCIAL

## 2023-06-06 DIAGNOSIS — M24.9 AC JOINT DERANGEMENT: Primary | ICD-10-CM

## 2023-06-06 DIAGNOSIS — G89.29 CHRONIC RIGHT SHOULDER PAIN: ICD-10-CM

## 2023-06-06 DIAGNOSIS — M25.511 CHRONIC RIGHT SHOULDER PAIN: ICD-10-CM

## 2023-06-06 PROCEDURE — 97112 NEUROMUSCULAR REEDUCATION: CPT

## 2023-06-06 PROCEDURE — 97110 THERAPEUTIC EXERCISES: CPT

## 2023-06-06 NOTE — PROGRESS NOTES
"Daily Note     Today's date: 2023  Patient name: Yazmin Fofana  : 2000  MRN: [id number]  Referring provider: Shabbir Benton MD  Dx:   Encounter Diagnosis     ICD-10-CM    1  AC joint derangement  M24 9       2  Chronic right shoulder pain  M25 511     G89 29           Start Time: 1700  Stop Time: 1745  Total time in clinic (min): 45 minutes    Subjective: Patient noted no shoulder pain at the start of the session  Patient noted at work she had L wrist pain  Objective: See treatment diary below      Assessment: Patient performed UBE aerobic exercise to increase blood flow to the area being treated, prepare the muscles for strength training and stretching, improve overall tolerance to activity, and aerobic endurance  PT introduced rows and scapular stabilization exercises to assist c pain with lifting and reaching  Patient required min VCs for form and noted no increased R shoulder pain  PT educated the patient on her HEP  Patient would benefit from continued PT to allow the patient to return to her PLOF  Plan: Continue per plan of care        Precautions: none      Manuals           R shoulder PROM nv            R shoulder posterior mob nv                                      Neuro Re-Ed             HEP edu & posture edu MW MW MW          Shoulder isometric nv            Scapular retraction x20 x20 HEP          Serratus punch nv x20 x20          sidelying ER nv x15 - 20 ea x20 ea          Chin tuck x15 x20  3\" hold x20  3\" hold          Quadruped serratus punch   x20          Ball on the wall (up/down, lateral, CW, CCW)   1x20\" ea (B)          Ther Ex             UBE nv 10' 10'          levator scap stretch nv 3x30\" ea           Scalene stretch nv 5x15\" ea           Rows and extensions nv nv GTB  2x10 ea  5\" hold          pec stretch 5x15\"  hold           No money  PTB  x15 PTB  x20          TRX rows   x20                       Ther Activity                                     " Gait Training                                       Modalities

## 2023-06-08 ENCOUNTER — APPOINTMENT (OUTPATIENT)
Age: 23
End: 2023-06-08
Payer: COMMERCIAL

## 2023-06-12 ENCOUNTER — OFFICE VISIT (OUTPATIENT)
Age: 23
End: 2023-06-12
Payer: COMMERCIAL

## 2023-06-12 DIAGNOSIS — G89.29 CHRONIC RIGHT SHOULDER PAIN: ICD-10-CM

## 2023-06-12 DIAGNOSIS — M25.511 CHRONIC RIGHT SHOULDER PAIN: ICD-10-CM

## 2023-06-12 DIAGNOSIS — M24.9 AC JOINT DERANGEMENT: Primary | ICD-10-CM

## 2023-06-12 PROCEDURE — 97112 NEUROMUSCULAR REEDUCATION: CPT

## 2023-06-12 PROCEDURE — 97110 THERAPEUTIC EXERCISES: CPT

## 2023-06-12 NOTE — PROGRESS NOTES
"Daily Note     Today's date: 2023  Patient name: Fern Valencia  : 2000  MRN: [id number]  Referring provider: Kimberly Parr MD  Dx:   Encounter Diagnosis     ICD-10-CM    1  AC joint derangement  M24 9       2  Chronic right shoulder pain  M25 511     G89 29           Start Time: 1745  Stop Time: 1830  Total time in clinic (min): 45 minutes    Subjective: Patient noted muscle soreness after last session that lasted for about 2-3 days, however today she feels good  Objective: See treatment diary below      Assessment: Patient performed UBE aerobic exercise to increase blood flow to the area being treated, prepare the muscles for strength training and stretching, improve overall tolerance to activity, and aerobic endurance  PT educated the patient about DOMS  Patient continues to improve 2* less pain and improved form throughout the session  PT educated and added to her HEP  Patient would benefit from continued PT to allow the patient to return to her PLOF  Plan: Continue per plan of care        Precautions: none      Manuals          R shoulder PROM nv            R shoulder posterior mob nv                                      Neuro Re-Ed             HEP edu & posture edu MW MW MW MW         Shoulder isometric nv            Scapular retraction x20 x20 HEP          Serratus punch nv x20 x20 x25         sidelying ER nv x15 - 20 ea x20 ea x20 ea         Chin tuck x15 x20  3\" hold x20  3\" hold          Quadruped serratus punch   x20 x20         Ball on the wall (up/down, lateral, CW, CCW)   1x20\" ea (B) 1x20\" ea (B)         Ther Ex             UBE nv 10' 10' 10'         levator scap stretch nv 3x30\" ea           Scalene stretch nv 5x15\" ea           Rows and extensions nv nv GTB  2x10 ea  5\" hold GTB  2x10 ea  5\" hold         pec stretch 5x15\"  hold           No money  PTB  x15 PTB  x20 PTB  x20         TRX rows   x20 x20                      Ther Activity                      " Gait Training                                       Modalities

## 2023-06-15 ENCOUNTER — APPOINTMENT (OUTPATIENT)
Age: 23
End: 2023-06-15
Payer: COMMERCIAL

## 2023-06-20 ENCOUNTER — OFFICE VISIT (OUTPATIENT)
Age: 23
End: 2023-06-20
Payer: COMMERCIAL

## 2023-06-20 DIAGNOSIS — G89.29 CHRONIC RIGHT SHOULDER PAIN: ICD-10-CM

## 2023-06-20 DIAGNOSIS — M25.511 CHRONIC RIGHT SHOULDER PAIN: ICD-10-CM

## 2023-06-20 DIAGNOSIS — M24.9 AC JOINT DERANGEMENT: Primary | ICD-10-CM

## 2023-06-20 PROCEDURE — 97110 THERAPEUTIC EXERCISES: CPT

## 2023-06-20 PROCEDURE — 97112 NEUROMUSCULAR REEDUCATION: CPT

## 2023-06-20 NOTE — PROGRESS NOTES
"Daily Note     Today's date: 2023  Patient name: Alvaro Welsh  : 2000  MRN: [id number]  Referring provider: Garrick Anton MD  Dx:   Encounter Diagnosis     ICD-10-CM    1  AC joint derangement  M24 9       2  Chronic right shoulder pain  M25 511     G89 29           Start Time: 1700  Stop Time: 1745  Total time in clinic (min): 45 minutes    Subjective: Patient noted no muscle soreness after last session  Patient noted she noted is feeling stronger with lifting at work, however her carpal tunnel is affecting her gripping  Objective: See treatment diary below      Assessment: Patient performed UBE aerobic exercise to increase blood flow to the area being treated, prepare the muscles for strength training and stretching, improve overall tolerance to activity, and aerobic endurance  PT introduced median nerve glides to assist c symptoms at work  PT introduced shoulder horizontal abduction and shoulder IR/ER band exercise to assist c stabilization with work  Patient required min VCs for form and noted fatigue post session  Patient would benefit from continued PT to allow the patient to return to her PLOF  Plan: Continue per plan of care        Precautions: none      Manuals         R shoulder PROM nv            R shoulder posterior mob nv                                      Neuro Re-Ed             HEP edu & posture edu MW MW MW MW MW        Shoulder isometric nv            Scapular retraction x20 x20 HEP          Serratus punch nv x20 x20 x25 x20  3#        sidelying ER nv x15 - 20 ea x20 ea x20 ea         Chin tuck x15 x20  3\" hold x20  3\" hold          Quadruped serratus punch   x20 x20 x20        Median nerve glide     x10 ea        Ball on the wall (up/down, lateral, CW, CCW)   1x20\" ea (B) 1x20\" ea (B) 1x25\" ea (B)        Ther Ex             UBE nv 10' 10' 10' 10'        levator scap stretch nv 3x30\" ea           Scalene stretch nv 5x15\" ea           Rows and " "extensions nv nv GTB  2x10 ea  5\" hold GTB  2x10 ea  5\" hold BTB  2x10 ea 5\" hold        IR/ER band exercise     Red single  x20 ea (B)        pec stretch 5x15\"  hold           No money  PTB  x15 PTB  x20 PTB  x20 PTB  x20        TRX rows   x20 x20 x20        Shoulder horizontal abduction     PTB  2x10 ea        Ther Activity                                       Gait Training                                       Modalities                                            "

## 2023-06-27 ENCOUNTER — APPOINTMENT (OUTPATIENT)
Age: 23
End: 2023-06-27
Payer: COMMERCIAL

## 2023-07-06 ENCOUNTER — APPOINTMENT (OUTPATIENT)
Age: 23
End: 2023-07-06
Payer: COMMERCIAL

## 2023-07-10 ENCOUNTER — OFFICE VISIT (OUTPATIENT)
Age: 23
End: 2023-07-10
Payer: COMMERCIAL

## 2023-07-10 DIAGNOSIS — M24.9 AC JOINT DERANGEMENT: Primary | ICD-10-CM

## 2023-07-10 DIAGNOSIS — M25.511 CHRONIC RIGHT SHOULDER PAIN: ICD-10-CM

## 2023-07-10 DIAGNOSIS — G89.29 CHRONIC RIGHT SHOULDER PAIN: ICD-10-CM

## 2023-07-10 PROCEDURE — 97112 NEUROMUSCULAR REEDUCATION: CPT

## 2023-07-10 PROCEDURE — 97110 THERAPEUTIC EXERCISES: CPT

## 2023-07-10 NOTE — PROGRESS NOTES
Daily Note     Today's date: 7/10/2023  Patient name: Jose M Otero  : 2000  MRN: [id number]  Referring provider: Pinky Foy MD  Dx:   Encounter Diagnosis     ICD-10-CM    1. AC joint derangement  M24.9       2. Chronic right shoulder pain  M25.511     G89.29           Start Time: 1700  Stop Time: 1745  Total time in clinic (min): 45 minutes    Subjective: Patient noted her shoulder is feeling better. Patient noted that she only has a 3/10 pain in the shoulder at the start of the session and she worked all day. Patient noted in general she has more wrist pain today. Objective: See treatment diary below      Assessment: Patient performed UBE aerobic exercise to increase blood flow to the area being treated, prepare the muscles for strength training and stretching, improve overall tolerance to activity, and aerobic endurance. PT introduced wrist strengthening exercises to assist c pain with gripping and lifting at work. Patient noted fatigue and required VCs for form. Overall, the patient is progressing due to improved shoulder pain. PT educuated the patient on her HEP. Patient would benefit from continued PT to allow the patient to return to her PLOF. Plan: Continue per plan of care. Patient will follow up in two weeks due to primary PT on vacation.       Precautions: none      Manuals 5/22 5/25 6/6 6/12 6/20 7/10       R shoulder PROM nv            R shoulder posterior mob nv                                      Neuro Re-Ed             HEP edu & posture edu MW MW MW MW MW MW       Shoulder isometric nv            Scapular retraction x20 x20 HEP          Serratus punch nv x20 x20 x25 x20  3#        sidelying ER nv x15 - 20 ea x20 ea x20 ea         Chin tuck x15 x20  3" hold x20  3" hold          Quadruped serratus punch   x20 x20 x20        Median nerve glide     x10 ea        Wrist Eccentric: flex, ext, radial deviation      1#  x20 ea       Ball on the wall (up/down, lateral, CW, CCW) 1x20" ea (B) 1x20" ea (B) 1x25" ea (B)        Ther Ex             UBE nv 10' 10' 10' 10' 10'       levator scap stretch nv 3x30" ea           Scalene stretch nv 5x15" ea           Rows and extensions nv nv GTB  2x10 ea  5" hold GTB  2x10 ea  5" hold BTB  2x10 ea 5" hold BTB  2x10 ea 5" hold       IR/ER band exercise     Red single  x20 ea (B) Red single  x20 ea (B)       pec stretch 5x15"  hold           No money  PTB  x15 PTB  x20 PTB  x20 PTB  x20        TRX rows   x20 x20 x20 x20       Shoulder horizontal abduction     PTB  2x10 ea        Ther Activity                                       Gait Training                                       Modalities

## 2023-07-25 ENCOUNTER — OFFICE VISIT (OUTPATIENT)
Age: 23
End: 2023-07-25
Payer: COMMERCIAL

## 2023-07-25 DIAGNOSIS — M24.9 AC JOINT DERANGEMENT: Primary | ICD-10-CM

## 2023-07-25 DIAGNOSIS — M25.511 CHRONIC RIGHT SHOULDER PAIN: ICD-10-CM

## 2023-07-25 DIAGNOSIS — G89.29 CHRONIC RIGHT SHOULDER PAIN: ICD-10-CM

## 2023-07-25 PROCEDURE — 97110 THERAPEUTIC EXERCISES: CPT

## 2023-07-25 PROCEDURE — 97112 NEUROMUSCULAR REEDUCATION: CPT

## 2023-07-25 NOTE — PROGRESS NOTES
Daily Note     Today's date: 2023  Patient name: Kym Haro  : 2000  MRN: [id number]  Referring provider: Dali Carey MD  Dx:   Encounter Diagnosis     ICD-10-CM    1. AC joint derangement  M24.9       2. Chronic right shoulder pain  M25.511     G89.29           Start Time: 1700  Stop Time: 1750  Total time in clinic (min): 50 minutes    Subjective: Patient noted last week she had one day of pain in the shoulder, however it went away. Patient noted that she has performed her HEP a few times this past week. Patient noted that her L wrist is painful today. Patient noted no pain after last session. Objective: See treatment diary below      Assessment: Patient performed UBE aerobic exercise to increase blood flow to the area being treated, prepare the muscles for strength training and stretching, improve overall tolerance to activity, and aerobic endurance. PT introduced therabar "U" and "N" to assist c pronation and supination strength. Patient noted some pain with pronation. Patient performed other exercises with min VCs for form. PT educated the patient on her HEP. Patient would benefit from continued PT to allow the patient to return to her PLOF. Plan: Continue per plan of care.       Precautions: none      Manuals 5/22 5/25 6/6 6/12 6/20 7/10 7/25      R shoulder PROM nv            R shoulder posterior mob nv                                      Neuro Re-Ed             HEP edu & posture edu MW MW MW MW MW MW MW      Shoulder isometric nv            Scapular retraction x20 x20 HEP          Serratus punch nv x20 x20 x25 x20  3#        sidelying ER nv x15 - 20 ea x20 ea x20 ea         Chin tuck x15 x20  3" hold x20  3" hold          Quadruped serratus punch   x20 x20 x20        Median nerve glide     x10 ea  x10 ea      Wrist Eccentric: flex, ext, radial deviation      1#  x20 ea 1#  x20 ea      Therabar U and N       Red  x20 ea      Ball on the wall (up/down, lateral, CW, CCW) 1x20" ea (B) 1x20" ea (B) 1x25" ea (B)        Ther Ex             UBE nv 10' 10' 10' 10' 10' 10'      levator scap stretch nv 3x30" ea           Scalene stretch nv 5x15" ea           Rows and extensions nv nv GTB  2x10 ea  5" hold GTB  2x10 ea  5" hold BTB  2x10 ea 5" hold BTB  2x10 ea 5" hold Black TB  3x10 ea      IR/ER band exercise     Red single  x20 ea (B) Red single  x20 ea (B)       pec stretch 5x15"  hold           No money  PTB  x15 PTB  x20 PTB  x20 PTB  x20        TRX rows   x20 x20 x20 x20 x20      Shoulder horizontal abduction     PTB  2x10 ea        Ther Activity                                       Gait Training                                       Modalities